# Patient Record
Sex: FEMALE | Race: WHITE | NOT HISPANIC OR LATINO
[De-identification: names, ages, dates, MRNs, and addresses within clinical notes are randomized per-mention and may not be internally consistent; named-entity substitution may affect disease eponyms.]

---

## 2018-01-05 PROBLEM — Z00.00 ENCOUNTER FOR PREVENTIVE HEALTH EXAMINATION: Status: ACTIVE | Noted: 2018-01-05

## 2018-01-07 ENCOUNTER — RECORD ABSTRACTING (OUTPATIENT)
Age: 38
End: 2018-01-07

## 2018-01-07 DIAGNOSIS — F41.9 ANXIETY DISORDER, UNSPECIFIED: ICD-10-CM

## 2018-01-07 DIAGNOSIS — Z87.891 PERSONAL HISTORY OF NICOTINE DEPENDENCE: ICD-10-CM

## 2018-01-07 DIAGNOSIS — Z83.49 FAMILY HISTORY OF OTHER ENDOCRINE, NUTRITIONAL AND METABOLIC DISEASES: ICD-10-CM

## 2018-01-10 ENCOUNTER — APPOINTMENT (OUTPATIENT)
Dept: FAMILY MEDICINE | Facility: CLINIC | Age: 38
End: 2018-01-10
Payer: COMMERCIAL

## 2018-01-10 VITALS
BODY MASS INDEX: 41.35 KG/M2 | SYSTOLIC BLOOD PRESSURE: 114 MMHG | HEIGHT: 61 IN | DIASTOLIC BLOOD PRESSURE: 62 MMHG | WEIGHT: 219 LBS | OXYGEN SATURATION: 99 % | RESPIRATION RATE: 16 BRPM | TEMPERATURE: 98.4 F | HEART RATE: 66 BPM

## 2018-01-10 DIAGNOSIS — Z11.1 ENCOUNTER FOR SCREENING FOR RESPIRATORY TUBERCULOSIS: ICD-10-CM

## 2018-01-10 DIAGNOSIS — Z23 ENCOUNTER FOR IMMUNIZATION: ICD-10-CM

## 2018-01-10 DIAGNOSIS — Z91.09 OTHER ALLERGY STATUS, OTHER THAN TO DRUGS AND BIOLOGICAL SUBSTANCES: ICD-10-CM

## 2018-01-10 PROCEDURE — 99213 OFFICE O/P EST LOW 20 MIN: CPT | Mod: 25

## 2018-01-10 PROCEDURE — 86580 TB INTRADERMAL TEST: CPT

## 2018-01-10 PROCEDURE — 90686 IIV4 VACC NO PRSV 0.5 ML IM: CPT

## 2018-01-10 PROCEDURE — G0008: CPT

## 2018-01-10 RX ORDER — ALBUTEROL SULFATE 90 UG/1
108 (90 BASE) AEROSOL, METERED RESPIRATORY (INHALATION)
Qty: 9 | Refills: 0 | Status: COMPLETED | COMMUNITY
Start: 2017-07-25

## 2018-01-10 RX ORDER — AZITHROMYCIN 250 MG/1
250 TABLET, FILM COATED ORAL
Qty: 6 | Refills: 0 | Status: COMPLETED | COMMUNITY
Start: 2017-08-02

## 2018-01-10 RX ORDER — PREDNISONE 20 MG/1
20 TABLET ORAL
Qty: 16 | Refills: 0 | Status: COMPLETED | COMMUNITY
Start: 2017-08-02

## 2018-07-02 ENCOUNTER — APPOINTMENT (OUTPATIENT)
Dept: FAMILY MEDICINE | Facility: CLINIC | Age: 38
End: 2018-07-02
Payer: COMMERCIAL

## 2018-07-02 VITALS
WEIGHT: 207 LBS | HEART RATE: 65 BPM | OXYGEN SATURATION: 98 % | BODY MASS INDEX: 39.11 KG/M2 | SYSTOLIC BLOOD PRESSURE: 116 MMHG | DIASTOLIC BLOOD PRESSURE: 78 MMHG

## 2018-07-02 DIAGNOSIS — L56.8 OTHER SPECIFIED ACUTE SKIN CHANGES DUE TO ULTRAVIOLET RADIATION: ICD-10-CM

## 2018-07-02 DIAGNOSIS — R51 HEADACHE: ICD-10-CM

## 2018-07-02 LAB — CYTOLOGY CVX/VAG DOC THIN PREP: NORMAL

## 2018-07-02 PROCEDURE — 99214 OFFICE O/P EST MOD 30 MIN: CPT

## 2018-07-02 NOTE — HISTORY OF PRESENT ILLNESS
[FreeTextEntry8] : Pt is here c/o headaches for about 3wks. Pt went to [facility] in [place] on 6/28/18 and had a stat MRI/MRA done but both came back negative. Pt also has a tick panel done but does not have the results yet. \par \par The headache is a dull headache constant right frontal and top of head sharp stabbing when she bends cough laughs sneeze  It is at the top behind the eye . She alternates tylenol sinus, motrin , steam , neti pot, essential oils. She has no sinus pain. She went to urgent car MRI MRA negative. and a tick panel.  No sleep complaints. I am not a headache person. It came on 3 weeks ago and it woke me up from sleep. It was weird bc I don’t get headaches. It went away and came back for about a week. Then 2 weeks ago it was so strong. It is not debilitating. I take allergy meds regularly. Very Photosensitive

## 2018-07-02 NOTE — ASSESSMENT
[FreeTextEntry1] : mri 's reviewed from bill and clinically she appears to be having migraine. trial maxalt. Follow up neurology.\par \par phone call to [facility] to comment on two MRI differences. \par \par We spent sufficient time to discuss aspects of care; questions were answered  to patient's satisfaction.The diagnosis and care plan were discussed with patient in detail.  Patient test results were  reviewed and explained in full. All questions were answered.\par

## 2018-07-02 NOTE — REVIEW OF SYSTEMS
[Fatigue] : fatigue [Headache] : headache [Negative] : Genitourinary [de-identified] : photosensitive

## 2018-07-02 NOTE — PHYSICAL EXAM

## 2018-07-26 ENCOUNTER — APPOINTMENT (OUTPATIENT)
Dept: FAMILY MEDICINE | Facility: CLINIC | Age: 38
End: 2018-07-26
Payer: COMMERCIAL

## 2018-07-26 VITALS
HEART RATE: 73 BPM | RESPIRATION RATE: 12 BRPM | SYSTOLIC BLOOD PRESSURE: 118 MMHG | DIASTOLIC BLOOD PRESSURE: 70 MMHG | OXYGEN SATURATION: 98 %

## 2018-07-26 PROCEDURE — 36415 COLL VENOUS BLD VENIPUNCTURE: CPT

## 2018-07-26 PROCEDURE — 99213 OFFICE O/P EST LOW 20 MIN: CPT | Mod: 25

## 2018-07-26 NOTE — ASSESSMENT
[FreeTextEntry1] : Take antibiotics,  rest,  increase fluids, wash hands to prevent the spread of  infection . Take mucinex dm over the counter. Take tylenol or advil for fever or body aches. Follow up if no better or worse respiratory symptoms.\par Take prednisone for wheezing. Reviewed prednisone instructions and taper. \par \par check lab for mono

## 2018-07-26 NOTE — PHYSICAL EXAM
[Ill-Appearing] : ill-appearing [Normal Rhythm/Effort] : normal respiratory rhythm and effort [Clear Bilaterally] : the lungs were clear to auscultation bilaterally [Normal to Percussion] : the lungs were normal to percussion [Normal] : palpation of the chest was normal [Normal Rate] : normal [Normal S1] : normal S1 [Normal S2] : normal S2 [S3] : no S3 [S4] : no S4 [No Murmur] : no murmurs heard [No Pitting Edema] : no pitting edema present [Right Carotid Bruit] : no bruit heard over the right carotid [Left Carotid Bruit] : no bruit heard over the left carotid [Right Femoral Bruit] : no bruit heard over the right femoral artery [Left Femoral Bruit] : no bruit heard over the left femoral artery [2+] : left 2+ [No Abnormalities] : the abdominal aorta was not enlarged and no bruit was heard [de-identified] : glands swollen, throat not red

## 2018-07-26 NOTE — HISTORY OF PRESENT ILLNESS
[FreeTextEntry8] : pt c/o st -cough -congestion -sinus x since last visit \par \par I took the medicine for migraines. It fell into a full blown sinus. I took Zpack and finished it on july 17. I went to urgent care tuesday strep test neg. They gave decadron 10 mg and it took down the swelling but my throat is still bad.\par \par I use albuterol 3  per week. I am not sure if it is allergy related. Could it be asthma. It is gradually getting worse.

## 2018-07-28 ENCOUNTER — TRANSCRIPTION ENCOUNTER (OUTPATIENT)
Age: 38
End: 2018-07-28

## 2018-07-29 LAB
ALBUMIN SERPL ELPH-MCNC: 4.1 G/DL
ALP BLD-CCNC: 15 U/L
ALT SERPL-CCNC: 26 U/L
ANION GAP SERPL CALC-SCNC: 14 MMOL/L
AST SERPL-CCNC: 25 U/L
BASOPHILS # BLD AUTO: 0.05 K/UL
BASOPHILS NFR BLD AUTO: 0.5 %
BILIRUB SERPL-MCNC: <0.2 MG/DL
BUN SERPL-MCNC: 22 MG/DL
CALCIUM SERPL-MCNC: 9.7 MG/DL
CHLORIDE SERPL-SCNC: 102 MMOL/L
CO2 SERPL-SCNC: 25 MMOL/L
CREAT SERPL-MCNC: 0.65 MG/DL
DEPRECATED KAPPA LC FREE/LAMBDA SER: 1.11 RATIO
EBV EA AB SER IA-ACNC: <5 U/ML
EBV EA AB TITR SER IF: NEGATIVE
EBV EA IGG SER QL IA: <3 U/ML
EBV EA IGG SER-ACNC: NEGATIVE
EBV EA IGM SER IA-ACNC: NEGATIVE
EBV PATRN SPEC IB-IMP: NORMAL
EBV VCA IGG SER IA-ACNC: <10 U/ML
EBV VCA IGM SER QL IA: <10 U/ML
EOSINOPHIL # BLD AUTO: 0.14 K/UL
EOSINOPHIL NFR BLD AUTO: 1.4 %
EPSTEIN-BARR VIRUS CAPSID ANTIGEN IGG: NEGATIVE
ERYTHROCYTE [SEDIMENTATION RATE] IN BLOOD BY WESTERGREN METHOD: 10 MM/HR
GLUCOSE SERPL-MCNC: 92 MG/DL
HCT VFR BLD CALC: 38.7 %
HETEROPH AB SER QL: NEGATIVE
HGB BLD-MCNC: 12.6 G/DL
IGA SER QL IEP: 134 MG/DL
IGG SER QL IEP: 1372 MG/DL
IGM SER QL IEP: 148 MG/DL
IMM GRANULOCYTES NFR BLD AUTO: 0.2 %
KAPPA LC CSF-MCNC: 1.43 MG/DL
KAPPA LC SERPL-MCNC: 1.59 MG/DL
LYMPHOCYTES # BLD AUTO: 3.2 K/UL
LYMPHOCYTES NFR BLD AUTO: 33.1 %
MAN DIFF?: NORMAL
MCHC RBC-ENTMCNC: 27.5 PG
MCHC RBC-ENTMCNC: 32.6 GM/DL
MCV RBC AUTO: 84.5 FL
MONOCYTES # BLD AUTO: 0.51 K/UL
MONOCYTES NFR BLD AUTO: 5.3 %
NEUTROPHILS # BLD AUTO: 5.75 K/UL
NEUTROPHILS NFR BLD AUTO: 59.5 %
PLATELET # BLD AUTO: 240 K/UL
POTASSIUM SERPL-SCNC: 4.7 MMOL/L
PROT SERPL-MCNC: 7.4 G/DL
RBC # BLD: 4.58 M/UL
RBC # FLD: 14.2 %
SODIUM SERPL-SCNC: 141 MMOL/L
WBC # FLD AUTO: 9.67 K/UL

## 2018-08-06 ENCOUNTER — APPOINTMENT (OUTPATIENT)
Dept: FAMILY MEDICINE | Facility: CLINIC | Age: 38
End: 2018-08-06
Payer: COMMERCIAL

## 2018-08-06 ENCOUNTER — LABORATORY RESULT (OUTPATIENT)
Age: 38
End: 2018-08-06

## 2018-08-06 ENCOUNTER — NON-APPOINTMENT (OUTPATIENT)
Age: 38
End: 2018-08-06

## 2018-08-06 VITALS — DIASTOLIC BLOOD PRESSURE: 70 MMHG | OXYGEN SATURATION: 98 % | HEART RATE: 84 BPM | SYSTOLIC BLOOD PRESSURE: 132 MMHG

## 2018-08-06 DIAGNOSIS — I10 ESSENTIAL (PRIMARY) HYPERTENSION: ICD-10-CM

## 2018-08-06 DIAGNOSIS — G25.81 RESTLESS LEGS SYNDROME: ICD-10-CM

## 2018-08-06 DIAGNOSIS — Z87.09 PERSONAL HISTORY OF OTHER DISEASES OF THE RESPIRATORY SYSTEM: ICD-10-CM

## 2018-08-06 DIAGNOSIS — R68.83 CHILLS (WITHOUT FEVER): ICD-10-CM

## 2018-08-06 DIAGNOSIS — R76.8 OTHER SPECIFIED ABNORMAL IMMUNOLOGICAL FINDINGS IN SERUM: ICD-10-CM

## 2018-08-06 DIAGNOSIS — R59.0 LOCALIZED ENLARGED LYMPH NODES: ICD-10-CM

## 2018-08-06 PROCEDURE — 36415 COLL VENOUS BLD VENIPUNCTURE: CPT

## 2018-08-06 PROCEDURE — 93000 ELECTROCARDIOGRAM COMPLETE: CPT

## 2018-08-06 PROCEDURE — 99214 OFFICE O/P EST MOD 30 MIN: CPT | Mod: 25

## 2018-08-06 RX ORDER — CLARITHROMYCIN 500 MG/1
500 TABLET, FILM COATED ORAL
Qty: 20 | Refills: 0 | Status: DISCONTINUED | COMMUNITY
Start: 2018-07-26 | End: 2018-08-06

## 2018-08-06 RX ORDER — PREDNISONE 20 MG/1
20 TABLET ORAL
Qty: 16 | Refills: 2 | Status: DISCONTINUED | COMMUNITY
Start: 2018-07-26 | End: 2018-08-06

## 2018-08-06 NOTE — REVIEW OF SYSTEMS
[Chills] : chills [Fatigue] : fatigue [Sore Throat] : sore throat [Palpitations] : palpitations [Negative] : Integumentary [Nasal Discharge] : no nasal discharge [Chest Pain] : no chest pain [FreeTextEntry2] : sweats

## 2018-08-06 NOTE — HISTORY OF PRESENT ILLNESS
[FreeTextEntry1] : Pt is here c/o sweats, chills, sore throat, palpations most at night  and high BP for the past month. [de-identified] : still having sweats and chills. I have been having high bp with my headaches.   as high as 160 but mostly 130. diastolic is fine. I have a pressure in my eyes and blurry vision with it. IT goes away. i was straining too much to see. The palpitations are at night. I can feel my heart pounding with my throat. I still have  sore throat  at the back of my tongue. AS soon as sore throat wore off sore throat came back. As soon as the steroids wore off I had body aches. Usually body aches go away after I broke out into a sweat. I have no fever. I get episodes of dizziness or shakiness. During the day I feel ok to continue by day. I push through. I saw ENT friday. It was normal. My tonsils were very small. Unremarkable exam. He suggested to monitor . Eye doctor visit one year ago.

## 2018-08-06 NOTE — PHYSICAL EXAM
[Ill-Appearing] : ill-appearing [Normal Rhythm/Effort] : normal respiratory rhythm and effort [Clear Bilaterally] : the lungs were clear to auscultation bilaterally [Normal to Percussion] : the lungs were normal to percussion [Normal] : palpation of the chest was normal [Normal Rate] : normal [Normal S1] : normal S1 [Normal S2] : normal S2 [No Murmur] : no murmurs heard [No Pitting Edema] : no pitting edema present [2+] : left 2+ [No Abnormalities] : the abdominal aorta was not enlarged and no bruit was heard [S3] : no S3 [S4] : no S4 [Right Carotid Bruit] : no bruit heard over the right carotid [Left Carotid Bruit] : no bruit heard over the left carotid [Right Femoral Bruit] : no bruit heard over the right femoral artery [Left Femoral Bruit] : no bruit heard over the left femoral artery [de-identified] : neck swollen bilaterally anterior painful. mobile  [de-identified] : thyroid not enlarged

## 2018-08-06 NOTE — ASSESSMENT
[FreeTextEntry1] : check further testing for thyroid  autoimmune disease, echocardiogram, She has seen cardiology already, she had ppd testing. lyme testing ,immune system and ebv testing already. She saw ent. The cough is resolved. Check sonogram and hold CT if patient is pregnant. She is attempting. \par Check echo for LVH. \par We spent sufficient time to discuss aspects of care; questions were answered  to patient's satisfaction.The diagnosis and care plan were discussed with patient in detail.  Patient test results were  reviewed and explained in full. All questions were answered.\par

## 2018-08-08 LAB
ASO AB SER LA-ACNC: <5 IU/ML
BASOPHILS # BLD AUTO: 0.02 K/UL
BASOPHILS NFR BLD AUTO: 0.2 %
C3 SERPL-MCNC: 135 MG/DL
C4 SERPL-MCNC: 28 MG/DL
CRP SERPL-MCNC: 0.16 MG/DL
DSDNA AB SER-ACNC: <12 IU/ML
EOSINOPHIL # BLD AUTO: 0.14 K/UL
EOSINOPHIL NFR BLD AUTO: 1.7 %
ERYTHROCYTE [SEDIMENTATION RATE] IN BLOOD BY WESTERGREN METHOD: 10 MM/HR
HCT VFR BLD CALC: 42.2 %
HGB BLD-MCNC: 13.5 G/DL
IMM GRANULOCYTES NFR BLD AUTO: 0.4 %
LYMPHOCYTES # BLD AUTO: 2.29 K/UL
LYMPHOCYTES NFR BLD AUTO: 27.2 %
MAN DIFF?: NORMAL
MCHC RBC-ENTMCNC: 27.4 PG
MCHC RBC-ENTMCNC: 32 GM/DL
MCV RBC AUTO: 85.8 FL
MONOCYTES # BLD AUTO: 0.45 K/UL
MONOCYTES NFR BLD AUTO: 5.4 %
NEUTROPHILS # BLD AUTO: 5.48 K/UL
NEUTROPHILS NFR BLD AUTO: 65.1 %
PLATELET # BLD AUTO: 232 K/UL
RBC # BLD: 4.92 M/UL
RBC # FLD: 14.7 %
RHEUMATOID FACT SER QL: <10 IU/ML
T3FREE SERPL-MCNC: 2.49 PG/ML
T3RU NFR SERPL: 1.06 INDEX
T4 FREE SERPL-MCNC: 1 NG/DL
T4 SERPL-MCNC: 6.1 UG/DL
TSH SERPL-ACNC: 1.35 UIU/ML
WBC # FLD AUTO: 8.41 K/UL

## 2018-08-10 LAB
ANA PAT FLD IF-IMP: ABNORMAL
ANA PATTERN: ABNORMAL
ANA SER IF-ACNC: ABNORMAL
ANA TITER: ABNORMAL

## 2018-08-13 LAB — BACTERIA BLD CULT: NORMAL

## 2019-01-09 ENCOUNTER — APPOINTMENT (OUTPATIENT)
Dept: FAMILY MEDICINE | Facility: CLINIC | Age: 39
End: 2019-01-09
Payer: COMMERCIAL

## 2019-01-09 VITALS
RESPIRATION RATE: 14 BRPM | HEART RATE: 80 BPM | SYSTOLIC BLOOD PRESSURE: 120 MMHG | HEIGHT: 61 IN | BODY MASS INDEX: 40.59 KG/M2 | WEIGHT: 215 LBS | DIASTOLIC BLOOD PRESSURE: 70 MMHG | OXYGEN SATURATION: 98 %

## 2019-01-09 DIAGNOSIS — J18.9 PNEUMONIA, UNSPECIFIED ORGANISM: ICD-10-CM

## 2019-01-09 PROCEDURE — 99214 OFFICE O/P EST MOD 30 MIN: CPT

## 2019-01-09 NOTE — PHYSICAL EXAM
[Ill-Appearing] : ill-appearing [Scattered Wheezes] : scattered wheezing was heard [Rales / Crackles Bilateral] : crackles were heard diffusely over both lungs [Rhonchi Bilateral] : rhonchi were heard diffusely over both lungs [Normal Rate] : normal [Normal S1] : normal S1 [Normal S2] : normal S2 [No Murmur] : no murmurs heard [No Pitting Edema] : no pitting edema present [2+] : left 2+ [No Abnormalities] : the abdominal aorta was not enlarged and no bruit was heard [S3] : no S3 [S4] : no S4 [Right Carotid Bruit] : no bruit heard over the right carotid [Left Carotid Bruit] : no bruit heard over the left carotid [Right Femoral Bruit] : no bruit heard over the right femoral artery [Left Femoral Bruit] : no bruit heard over the left femoral artery

## 2019-01-09 NOTE — ASSESSMENT
[FreeTextEntry1] : add levaquin to doxycyline\par restart prednisone at 60 mg for 3 days and taper 10 mg per day\par add budesonide and albuterol into nebulizer q 4 hr\par  Rest\par ct chest no contrast for hemoptysis\par

## 2019-01-09 NOTE — HEALTH RISK ASSESSMENT
[No falls in past year] : Patient reported no falls in the past year [0] : 2) Feeling down, depressed, or hopeless: Not at all (0) [] : No [de-identified] : ent gastro [[id number]] : 0

## 2019-01-09 NOTE — REVIEW OF SYSTEMS
[Fever] : fever [Chills] : chills [Nasal Discharge] : nasal discharge [Sore Throat] : sore throat [Postnasal Drip] : postnasal drip [Shortness Of Breath] : shortness of breath [Wheezing] : wheezing [Cough] : cough [Negative] : Cardiovascular [Fatigue] : no fatigue

## 2019-08-29 ENCOUNTER — TRANSCRIPTION ENCOUNTER (OUTPATIENT)
Age: 39
End: 2019-08-29

## 2019-12-17 ENCOUNTER — NON-APPOINTMENT (OUTPATIENT)
Age: 39
End: 2019-12-17

## 2019-12-17 ENCOUNTER — APPOINTMENT (OUTPATIENT)
Dept: FAMILY MEDICINE | Facility: CLINIC | Age: 39
End: 2019-12-17
Payer: COMMERCIAL

## 2019-12-17 VITALS
HEART RATE: 67 BPM | RESPIRATION RATE: 14 BRPM | WEIGHT: 215 LBS | DIASTOLIC BLOOD PRESSURE: 78 MMHG | HEIGHT: 61 IN | SYSTOLIC BLOOD PRESSURE: 120 MMHG | BODY MASS INDEX: 40.59 KG/M2 | OXYGEN SATURATION: 99 % | TEMPERATURE: 98.7 F

## 2019-12-17 DIAGNOSIS — J45.909 UNSPECIFIED ASTHMA, UNCOMPLICATED: ICD-10-CM

## 2019-12-17 DIAGNOSIS — M79.603 PAIN IN ARM, UNSPECIFIED: ICD-10-CM

## 2019-12-17 DIAGNOSIS — Z87.898 PERSONAL HISTORY OF OTHER SPECIFIED CONDITIONS: ICD-10-CM

## 2019-12-17 DIAGNOSIS — Z23 ENCOUNTER FOR IMMUNIZATION: ICD-10-CM

## 2019-12-17 DIAGNOSIS — E04.1 NONTOXIC SINGLE THYROID NODULE: ICD-10-CM

## 2019-12-17 DIAGNOSIS — Z12.39 ENCOUNTER FOR OTHER SCREENING FOR MALIGNANT NEOPLASM OF BREAST: ICD-10-CM

## 2019-12-17 PROCEDURE — G0009: CPT

## 2019-12-17 PROCEDURE — 93000 ELECTROCARDIOGRAM COMPLETE: CPT

## 2019-12-17 PROCEDURE — 90732 PPSV23 VACC 2 YRS+ SUBQ/IM: CPT

## 2019-12-17 PROCEDURE — 99213 OFFICE O/P EST LOW 20 MIN: CPT | Mod: 25

## 2019-12-17 RX ORDER — PREDNISONE 50 MG/1
50 TABLET ORAL
Qty: 5 | Refills: 0 | Status: DISCONTINUED | COMMUNITY
Start: 2019-01-06 | End: 2019-12-17

## 2019-12-17 RX ORDER — RIZATRIPTAN BENZOATE 10 MG/1
10 TABLET, ORALLY DISINTEGRATING ORAL
Qty: 12 | Refills: 6 | Status: DISCONTINUED | COMMUNITY
Start: 2018-07-02 | End: 2019-12-17

## 2019-12-17 RX ORDER — PREDNISONE 20 MG/1
20 TABLET ORAL
Qty: 16 | Refills: 0 | Status: DISCONTINUED | COMMUNITY
Start: 2019-01-09 | End: 2019-12-17

## 2019-12-17 RX ORDER — DOXYCYCLINE 100 MG/1
100 CAPSULE ORAL
Qty: 20 | Refills: 0 | Status: DISCONTINUED | COMMUNITY
Start: 2019-01-07 | End: 2019-12-17

## 2019-12-17 RX ORDER — LEVOFLOXACIN 500 MG/1
500 TABLET, FILM COATED ORAL DAILY
Qty: 10 | Refills: 0 | Status: DISCONTINUED | COMMUNITY
Start: 2019-01-09 | End: 2019-12-17

## 2019-12-17 RX ORDER — CLOTRIMAZOLE 10 MG/1
10 LOZENGE ORAL 4 TIMES DAILY
Qty: 40 | Refills: 0 | Status: DISCONTINUED | COMMUNITY
Start: 2018-08-08 | End: 2019-12-17

## 2019-12-17 RX ORDER — BUDESONIDE 0.5 MG/2ML
0.5 INHALANT ORAL TWICE DAILY
Qty: 1 | Refills: 6 | Status: DISCONTINUED | COMMUNITY
Start: 2019-01-09 | End: 2019-12-17

## 2019-12-17 NOTE — REVIEW OF SYSTEMS
[Joint Pain] : joint pain [Muscle Pain] : muscle pain [Fever] : no fever [Chills] : no chills [Chest Pain] : no chest pain [Palpitations] : no palpitations [Shortness Of Breath] : no shortness of breath

## 2019-12-17 NOTE — PLAN
[FreeTextEntry1] : \par left arm pain with weakness in shoulder on exam s/p lifting boxes, denies chest pain, jaw pain, sob, palpitations \par xray shoulder/humerus r/o calcification- stat she will look for my call \par ortho consult in case xray neg and no improvement, she said she has a time limitation bc she is getting IVF performed soon\par naproxen q12hrs prn with food, denies hx bleeding issues, also flexeril at bedtime only, she said there is no way she is pregnant right now but she will stop meds before ivf\par -EKG- NSR @ 61  BPM, NORMAL AXIS, NORMAL LA/QT INTERVAL, NO ST/ T WAVE ABNORMALITIES NOTED\par \par \par breast cancer screening due\par due for reeval of thyroid nodule\par tsh wnl recently performed she showed me result in office on her phone\par \par pneumococal vaccine given, hx asthma, she requested the vaccine at appointment \par consent obtained\par right arm IM no adverse reactions noted\par \par f/u as needed pt agreed w/plan\par \par

## 2019-12-17 NOTE — COUNSELING
[Discussed at today's visit] : Advance directives discussed at today's visit [Designated Health Care Proxy] : Patient has a designated Health Care Proxy [Name: ___] : Health Care Proxy's Name: [unfilled] [Relationship: ___] : Relationship: [unfilled]

## 2019-12-17 NOTE — HISTORY OF PRESENT ILLNESS
[FreeTextEntry8] : patient c/o pain in left arm x 4 days\par + tender to touch, -swelling, -numbness, -tingling, +any radiating pain, +any known injury to area(lower Stephane decoration from attic), \par \par she said she was lifting boxes when it happened\par she said her left arm is throbbing, radiating down to fingers\par she said she cant clip her bra or break spaghetti\par she said the boxes werent too heavy but she was lowering them out of the attic,boxes maybe 20lbs\par denies chest pain or palpitations or sob, fevers or chills, warmth, swellling, redness \par \par c/o   throbbing  pain,   7 /10 constant  , alleviated by motrin 800mg    , aggravated by   movement by clipping bra and moving her arm forward \par denies shoulder pain\par \par denies chest pain, jaw pain, sob, palpitations \par hcp Bakari Chang\par \par

## 2019-12-17 NOTE — PHYSICAL EXAM
[No Lymphadenopathy] : no lymphadenopathy [Supple] : supple [Normal] : normal rate, regular rhythm, normal S1 and S2 and no murmur heard [No Focal Deficits] : no focal deficits [de-identified] : strength right upper extremity 5/5 , left 4/5 , hand  5/5 b/l  [de-identified] : pain on palpation of mid left arm, no edema, erythema or warmth, no pain on palpation of shoulder, weakness with left emtpy can test, umable to bring arm to 90 degrees completely or completely abduct arm

## 2020-12-16 PROBLEM — Z87.09 HISTORY OF SORE THROAT: Status: RESOLVED | Noted: 2018-07-26 | Resolved: 2020-12-16

## 2021-01-11 ENCOUNTER — TRANSCRIPTION ENCOUNTER (OUTPATIENT)
Age: 41
End: 2021-01-11

## 2023-07-07 ENCOUNTER — APPOINTMENT (OUTPATIENT)
Dept: ORTHOPEDIC SURGERY | Facility: CLINIC | Age: 43
End: 2023-07-07
Payer: COMMERCIAL

## 2023-07-07 VITALS
WEIGHT: 206 LBS | BODY MASS INDEX: 38.89 KG/M2 | HEIGHT: 61 IN | SYSTOLIC BLOOD PRESSURE: 113 MMHG | DIASTOLIC BLOOD PRESSURE: 74 MMHG

## 2023-07-07 DIAGNOSIS — M25.551 PAIN IN RIGHT HIP: ICD-10-CM

## 2023-07-07 DIAGNOSIS — S73.191A OTHER SPRAIN OF RIGHT HIP, INITIAL ENCOUNTER: ICD-10-CM

## 2023-07-07 PROCEDURE — 99204 OFFICE O/P NEW MOD 45 MIN: CPT

## 2023-07-07 PROCEDURE — 73502 X-RAY EXAM HIP UNI 2-3 VIEWS: CPT

## 2023-07-07 RX ORDER — NAPROXEN 500 MG/1
500 TABLET ORAL
Qty: 14 | Refills: 0 | Status: DISCONTINUED | COMMUNITY
Start: 2019-12-17 | End: 2023-07-07

## 2023-07-07 RX ORDER — ALBUTEROL SULFATE 90 UG/1
INHALANT RESPIRATORY (INHALATION)
Refills: 0 | Status: DISCONTINUED | COMMUNITY
End: 2023-07-07

## 2023-07-07 RX ORDER — ALBUTEROL SULFATE 2.5 MG/3ML
(2.5 MG/3ML) SOLUTION RESPIRATORY (INHALATION)
Qty: 1 | Refills: 0 | Status: DISCONTINUED | COMMUNITY
Start: 2019-01-09 | End: 2023-07-07

## 2023-07-07 RX ORDER — CYCLOBENZAPRINE HYDROCHLORIDE 10 MG/1
10 TABLET, FILM COATED ORAL
Qty: 5 | Refills: 0 | Status: DISCONTINUED | COMMUNITY
Start: 2019-12-17 | End: 2023-07-07

## 2023-07-07 NOTE — PHYSICAL EXAM
[de-identified] : The patient appears well nourished  and in no apparent distress.  The patient is alert and oriented to person, place, and time.   Affect and mood appear normal. The head is normocephalic and atraumatic.  The eyes reveal normal sclera and extra ocular muscles are intact. The tongue is midline with no apparent lesions.  Skin shows normal turgor with no evidence of eczema or psoriasis.  No respiratory distress noted.  Sensation grossly intact.		  [de-identified] : Exam of the right hip shows hip flexion of 110 degrees without pain, hip external rotation of 50 degrees without pain. There is groin pain with hip flexion adduction internal rotation. There is tenderness over the GT bursa. \par 5/5 motor strength bilaterally distally. Sensation intact distally.		  [de-identified] : X-ray: AP view of the pelvis and 2 views of the right hip demonstrate	a well preserved right hip joint space.

## 2023-07-07 NOTE — ADDENDUM
[FreeTextEntry1] : This note was authored by Chuck Green working as a medical scribe for Dr. Bob Allred. The note was reviewed, edited, and revised by Dr. Bob Allred whom is in agreement with the exam findings, imaging findings, and treatment plan. 07/07/2023

## 2023-07-07 NOTE — CONSULT LETTER
[Dear  ___] : Dear  [unfilled], [Consult Letter:] : I had the pleasure of evaluating your patient, [unfilled]. [Please see my note below.] : Please see my note below. [Consult Closing:] : Thank you very much for allowing me to participate in the care of this patient.  If you have any questions, please do not hesitate to contact me. [Sincerely,] : Sincerely, [FreeTextEntry2] : NATE MCLEAN MD\par  [FreeTextEntry3] : Bob Allred MD\par Chief of Joint Replacement\par [facility] K[facility] Replacement \par [facility]\par \par

## 2023-07-07 NOTE — DISCUSSION/SUMMARY
[de-identified] : PATTY ISAAC is a 43 year old female who presents with right hip GT bursitis and exam findings concerning for a labral tear. An MRI of the patient's right hip was ordered to evaluate for labral tearing. The patient will follow up after imaging is completed to review the results in the office. A course of Mobic was recommended. The patient was given a prescription for the Mobic with directions. She was instructed to stop the medicine and call the office if there are any adverse reaction to the medicine.

## 2023-07-07 NOTE — HISTORY OF PRESENT ILLNESS
[de-identified] : Patient is a 43-year-old female presenting for evaluation of a few years worth of right hip pain now worsening.  Her right hip pain is localized to the right groin and is worse with all weightbearing activity including walking any distance and rising from a seated position.  She also has pain with deep flexion and internal rotation.  Patient has not had any intra-articular injections.  She has not had an MRI.  Patient has tried physical therapy and anti-inflammatories at significant improvement.

## 2023-08-15 ENCOUNTER — RX RENEWAL (OUTPATIENT)
Age: 43
End: 2023-08-15

## 2023-08-15 RX ORDER — MELOXICAM 7.5 MG/1
7.5 TABLET ORAL
Qty: 60 | Refills: 0 | Status: ACTIVE | COMMUNITY
Start: 2023-07-07 | End: 1900-01-01

## 2024-02-28 ENCOUNTER — NON-APPOINTMENT (OUTPATIENT)
Age: 44
End: 2024-02-28